# Patient Record
Sex: FEMALE | Race: WHITE | Employment: FULL TIME | URBAN - METROPOLITAN AREA
[De-identification: names, ages, dates, MRNs, and addresses within clinical notes are randomized per-mention and may not be internally consistent; named-entity substitution may affect disease eponyms.]

---

## 2021-07-31 ENCOUNTER — APPOINTMENT (OUTPATIENT)
Dept: GENERAL RADIOLOGY | Age: 51
End: 2021-07-31
Attending: EMERGENCY MEDICINE
Payer: OTHER GOVERNMENT

## 2021-07-31 ENCOUNTER — HOSPITAL ENCOUNTER (EMERGENCY)
Age: 51
Discharge: HOME | End: 2021-07-31
Attending: EMERGENCY MEDICINE
Payer: OTHER GOVERNMENT

## 2021-07-31 VITALS
SYSTOLIC BLOOD PRESSURE: 114 MMHG | TEMPERATURE: 98.5 F | WEIGHT: 150 LBS | DIASTOLIC BLOOD PRESSURE: 75 MMHG | BODY MASS INDEX: 27.6 KG/M2 | HEIGHT: 62 IN | OXYGEN SATURATION: 99 % | RESPIRATION RATE: 18 BRPM | HEART RATE: 79 BPM

## 2021-07-31 DIAGNOSIS — S90.121A CONTUSION OF LESSER TOE OF RIGHT FOOT WITHOUT DAMAGE TO NAIL, INITIAL ENCOUNTER: Primary | ICD-10-CM

## 2021-07-31 PROCEDURE — 99283 EMERGENCY DEPT VISIT LOW MDM: CPT

## 2021-07-31 PROCEDURE — 73660 X-RAY EXAM OF TOE(S): CPT

## 2021-07-31 RX ORDER — BUTALBITAL, ACETAMINOPHEN, CAFFEINE AND CODEINE PHOSPHATE 50; 325; 40; 30 MG/1; MG/1; MG/1; MG/1
1 CAPSULE ORAL
COMMUNITY

## 2021-07-31 NOTE — ED TRIAGE NOTES
C/o right 2nd toe pain/injury; states she stubbed it on a curb yesterday. States she has iced the toe and taken Motrin for pain with minimal relief from pain.

## 2021-07-31 NOTE — ED PROVIDER NOTES
EMERGENCY DEPARTMENT HISTORY AND PHYSICAL EXAM    5:53 PM    Date: 2021  Patient Name: Tarah Boykin    History of Presenting Illness     Chief Complaint   Patient presents with    Toe Pain       History Provided By: Patient  Location/Duration/Severity/Modifying factors   Patient is a 59-year-old female presenting with right second toe pain. Patient reports that yesterday she stubbed her toe on a piece of sidewalk in concrete. She is concerned it may be broken. She denies any open wound or any nail injury. Nothing seems to make her pain better or worse. It is most tender at the distal phalanx. No other injuries. PCP: Cameron Dee, DO    Current Outpatient Medications   Medication Sig Dispense Refill    levothyroxine sodium (SYNTHROID PO) Take  by mouth daily.  codeine-butalbital-acetaminophen-caffeine (FIORICET WITH CODEINE) -54-30 mg capsule Take 1 Capsule by mouth every four (4) hours as needed for Headache. Past History     Past Medical History:  Past Medical History:   Diagnosis Date    Endocrine disease        Past Surgical History:  Past Surgical History:   Procedure Laterality Date    HX BREAST AUGMENTATION      HX  SECTION      HX TONSILLECTOMY         Family History:  History reviewed. No pertinent family history. Social History:  Social History     Tobacco Use    Smoking status: Current Every Day Smoker     Packs/day: 0.50     Years: 30.00     Pack years: 15.00   Substance Use Topics    Alcohol use: Yes     Comment: social    Drug use: Not Currently       Allergies:  No Known Allergies    I reviewed and confirmed the above information with patient and updated as necessary. Review of Systems     Review of Systems   Constitutional: Negative for fever. HENT: Negative for congestion. Respiratory: Negative for shortness of breath. Cardiovascular: Negative for chest pain. Genitourinary: Negative for difficulty urinating. Musculoskeletal: Positive for arthralgias (2nd toe pain). Negative for back pain and neck pain. Physical Exam     Visit Vitals  /75 (BP 1 Location: Left upper arm, BP Patient Position: At rest)   Pulse 79   Temp 98.5 °F (36.9 °C)   Resp 18   Ht 5' 2\" (1.575 m)   Wt 68 kg (150 lb)   SpO2 99%   BMI 27.44 kg/m²       Physical Exam  HENT:      Head: Normocephalic and atraumatic. Mouth/Throat:      Mouth: Mucous membranes are moist.   Eyes:      Conjunctiva/sclera: Conjunctivae normal.   Cardiovascular:      Rate and Rhythm: Normal rate and regular rhythm. Pulmonary:      Effort: Pulmonary effort is normal.      Breath sounds: Normal breath sounds. No wheezing. Musculoskeletal:      Cervical back: Normal range of motion and neck supple. Right lower leg: No edema. Left lower leg: No edema. Comments: There is tenderness to the distal phalanx of the right second toe. There is no nail damage, there is no other areas of pain or tenderness   Skin:     General: Skin is warm and dry. Capillary Refill: Capillary refill takes less than 2 seconds. Findings: No rash. Diagnostic Study Results     Labs -  No results found for this or any previous visit (from the past 24 hour(s)). Radiologic Studies -   XR 2ND TOE RT MIN 2 V    (Results Pending)   My interpretation of the toe x-ray, I question if there may be a fracture on the Lateral view, there is no displaced fracture or deformity        Medical Decision Making   I am the first provider for this patient. I reviewed the vital signs, available nursing notes, past medical history, past surgical history, family history and social history. Vital Signs-Reviewed the patient's vital signs.     EKG: N/A    Records Reviewed: Nursing Notes, Old Medical Records, Previous Radiology Studies and Previous Laboratory Studies (Time of Review: 5:53 PM)    ED Course: Progress Notes, Reevaluation, and Consults:         Provider Notes (Medical Decision Making):   MDM  Number of Diagnoses or Management Options  Contusion of lesser toe of right foot without damage to nail, initial encounter  Diagnosis management comments: 51-year-old female presenting with toe injury. Patient reports the injury happened yesterday. She has never had this happen to her before. Differential includes fracture, contusion, subungual hematoma, dislocation deformity, etc.    On x-ray there may be a questionable area of hairline fracture to the second toe, regardless treatment will be the same we will plan for buddy taping, will have her follow-up with PCP and/or podiatry should she need it. At this time, patient is stable and appropriate for discharge home.  Patient demonstrates understanding of current diagnoses and is in agreement with the treatment plan. Beryle Fishman are advised that while the likelihood of serious underlying condition is low at this point given the evaluation performed today, we cannot fully rule it out. Beryle Fishman are advised to immediately return with any new symptoms or worsening of current condition.  All questions have been answered. Dm Yang is given educational material regarding their diagnoses, including danger symptoms and when to return to the ED. This note was dictated utilizing Dragon voice recognition software. Unfortunately this leads to occasional typographical errors. I apologize in advance if the situation occurs. If questions occur please do not hesitate to contact me directly. Natalia Cabrera, DO          Procedures    Critical Care Time: N/A    Diagnosis     Clinical Impression:   1.  Contusion of lesser toe of right foot without damage to nail, initial encounter        Disposition: Discharge    Follow-up Information     Follow up With Specialties Details Why Contact Info    Bradley Kocher, [address] [id number] [address]      Yonas Gimenez DPM Podiatry In 1 week As needed [address] [place] [address]  [address] [facility] EMERGENCY DEPT Emergency Medicine  As needed, If symptoms worsen; or [facility] Emergency Department [address] [facility]  [phone]           Patient's Medications   Start Taking    No medications on file   Continue Taking    CODEINE-BUTALBITAL-ACETAMINOPHEN-CAFFEINE (FIORICET WITH CODEINE) -08-30 MG CAPSULE    Take 1 Capsule by mouth every four (4) hours as needed for Headache. LEVOTHYROXINE SODIUM (SYNTHROID PO)    Take  by mouth daily. These Medications have changed    No medications on file   Stop Taking    No medications on file       Damaso Garay DO   Emergency Medicine   July 31, 2021, 5:53 PM     This note is dictated utilizing Dragon voice recognition software. Unfortunately this leads to occasional typographical errors using the voice recognition. I apologize in advance if the situation occurs. If questions occur please do not hesitate to contact me directly.     Damaso Garay, DO

## 2022-11-20 ENCOUNTER — APPOINTMENT (OUTPATIENT)
Dept: CT IMAGING | Age: 52
End: 2022-11-20
Attending: EMERGENCY MEDICINE
Payer: OTHER GOVERNMENT

## 2022-11-20 ENCOUNTER — HOSPITAL ENCOUNTER (EMERGENCY)
Age: 52
Discharge: HOME | End: 2022-11-20
Attending: EMERGENCY MEDICINE
Payer: OTHER GOVERNMENT

## 2022-11-20 VITALS
DIASTOLIC BLOOD PRESSURE: 75 MMHG | HEIGHT: 62 IN | HEART RATE: 89 BPM | BODY MASS INDEX: 23 KG/M2 | TEMPERATURE: 97.4 F | RESPIRATION RATE: 18 BRPM | SYSTOLIC BLOOD PRESSURE: 133 MMHG | OXYGEN SATURATION: 98 % | WEIGHT: 125 LBS

## 2022-11-20 DIAGNOSIS — R10.13 ABDOMINAL PAIN, EPIGASTRIC: Primary | ICD-10-CM

## 2022-11-20 LAB
ALBUMIN SERPL-MCNC: 4.1 G/DL (ref 3.4–5)
ALBUMIN/GLOB SERPL: 1.4 {RATIO} (ref 0.8–1.7)
ALP SERPL-CCNC: 116 U/L (ref 45–117)
ALT SERPL-CCNC: 96 U/L (ref 13–56)
ANION GAP SERPL CALC-SCNC: 4 MMOL/L (ref 3–18)
APPEARANCE UR: CLEAR
AST SERPL-CCNC: 161 U/L (ref 10–38)
ATRIAL RATE: 86 BPM
BASOPHILS # BLD: 0 K/UL (ref 0–0.1)
BASOPHILS NFR BLD: 0 % (ref 0–2)
BILIRUB SERPL-MCNC: 0.5 MG/DL (ref 0.2–1)
BILIRUB UR QL: NEGATIVE
BUN SERPL-MCNC: 7 MG/DL (ref 7–18)
BUN/CREAT SERPL: 9 (ref 12–20)
CALCIUM SERPL-MCNC: 9.7 MG/DL (ref 8.5–10.1)
CALCULATED P AXIS, ECG09: 77 DEGREES
CALCULATED R AXIS, ECG10: 64 DEGREES
CALCULATED T AXIS, ECG11: 70 DEGREES
CHLORIDE SERPL-SCNC: 106 MMOL/L (ref 100–111)
CO2 SERPL-SCNC: 32 MMOL/L (ref 21–32)
COLOR UR: YELLOW
CREAT SERPL-MCNC: 0.75 MG/DL (ref 0.6–1.3)
DIAGNOSIS, 93000: NORMAL
DIFFERENTIAL METHOD BLD: NORMAL
EOSINOPHIL # BLD: 0.1 K/UL (ref 0–0.4)
EOSINOPHIL NFR BLD: 2 % (ref 0–5)
ERYTHROCYTE [DISTWIDTH] IN BLOOD BY AUTOMATED COUNT: 11.9 % (ref 11.6–14.5)
GLOBULIN SER CALC-MCNC: 3 G/DL (ref 2–4)
GLUCOSE SERPL-MCNC: 106 MG/DL (ref 74–99)
GLUCOSE UR STRIP.AUTO-MCNC: NEGATIVE MG/DL
HCT VFR BLD AUTO: 40.8 % (ref 35–45)
HGB BLD-MCNC: 14 G/DL (ref 12–16)
HGB UR QL STRIP: NEGATIVE
IMM GRANULOCYTES # BLD AUTO: 0 K/UL (ref 0–0.04)
IMM GRANULOCYTES NFR BLD AUTO: 0 % (ref 0–0.5)
KETONES UR QL STRIP.AUTO: NEGATIVE MG/DL
LEUKOCYTE ESTERASE UR QL STRIP.AUTO: NEGATIVE
LIPASE SERPL-CCNC: 116 U/L (ref 73–393)
LYMPHOCYTES # BLD: 2.3 K/UL (ref 0.9–3.6)
LYMPHOCYTES NFR BLD: 28 % (ref 21–52)
MCH RBC QN AUTO: 33.3 PG (ref 24–34)
MCHC RBC AUTO-ENTMCNC: 34.3 G/DL (ref 31–37)
MCV RBC AUTO: 96.9 FL (ref 78–100)
MONOCYTES # BLD: 0.5 K/UL (ref 0.05–1.2)
MONOCYTES NFR BLD: 6 % (ref 3–10)
NEUTS SEG # BLD: 5.4 K/UL (ref 1.8–8)
NEUTS SEG NFR BLD: 65 % (ref 40–73)
NITRITE UR QL STRIP.AUTO: NEGATIVE
NRBC # BLD: 0 K/UL (ref 0–0.01)
NRBC BLD-RTO: 0 PER 100 WBC
P-R INTERVAL, ECG05: 146 MS
PH UR STRIP: 7.5 [PH] (ref 5–8)
PLATELET # BLD AUTO: 238 K/UL (ref 135–420)
PMV BLD AUTO: 9.6 FL (ref 9.2–11.8)
POTASSIUM SERPL-SCNC: 4.1 MMOL/L (ref 3.5–5.5)
PROT SERPL-MCNC: 7.1 G/DL (ref 6.4–8.2)
PROT UR STRIP-MCNC: NEGATIVE MG/DL
Q-T INTERVAL, ECG07: 358 MS
QRS DURATION, ECG06: 74 MS
QTC CALCULATION (BEZET), ECG08: 428 MS
RBC # BLD AUTO: 4.21 M/UL (ref 4.2–5.3)
SODIUM SERPL-SCNC: 142 MMOL/L (ref 136–145)
SP GR UR REFRACTOMETRY: <1.005 (ref 1–1.03)
UROBILINOGEN UR QL STRIP.AUTO: 0.2 EU/DL (ref 0.2–1)
VENTRICULAR RATE, ECG03: 86 BPM
WBC # BLD AUTO: 8.4 K/UL (ref 4.6–13.2)

## 2022-11-20 PROCEDURE — 81003 URINALYSIS AUTO W/O SCOPE: CPT

## 2022-11-20 PROCEDURE — 99285 EMERGENCY DEPT VISIT HI MDM: CPT

## 2022-11-20 PROCEDURE — 93005 ELECTROCARDIOGRAM TRACING: CPT

## 2022-11-20 PROCEDURE — 74011000636 HC RX REV CODE- 636: Performed by: EMERGENCY MEDICINE

## 2022-11-20 PROCEDURE — 83690 ASSAY OF LIPASE: CPT

## 2022-11-20 PROCEDURE — 74177 CT ABD & PELVIS W/CONTRAST: CPT

## 2022-11-20 PROCEDURE — 96372 THER/PROPH/DIAG INJ SC/IM: CPT

## 2022-11-20 PROCEDURE — 85025 COMPLETE CBC W/AUTO DIFF WBC: CPT

## 2022-11-20 PROCEDURE — 80053 COMPREHEN METABOLIC PANEL: CPT

## 2022-11-20 PROCEDURE — 74011250636 HC RX REV CODE- 250/636: Performed by: EMERGENCY MEDICINE

## 2022-11-20 RX ORDER — BUPROPION HYDROCHLORIDE 75 MG/1
75 TABLET ORAL 2 TIMES DAILY
COMMUNITY

## 2022-11-20 RX ORDER — FAMOTIDINE 20 MG/1
20 TABLET, FILM COATED ORAL 2 TIMES DAILY
Qty: 10 TABLET | Refills: 0 | Status: SHIPPED | OUTPATIENT
Start: 2022-11-20 | End: 2022-11-25

## 2022-11-20 RX ORDER — DICYCLOMINE HYDROCHLORIDE 10 MG/ML
20 INJECTION INTRAMUSCULAR
Status: COMPLETED | OUTPATIENT
Start: 2022-11-20 | End: 2022-11-20

## 2022-11-20 RX ORDER — DICYCLOMINE HYDROCHLORIDE 20 MG/1
20 TABLET ORAL EVERY 6 HOURS
Qty: 15 TABLET | Refills: 0 | Status: SHIPPED | OUTPATIENT
Start: 2022-11-20

## 2022-11-20 RX ORDER — ONDANSETRON 4 MG/1
4 TABLET, FILM COATED ORAL
Qty: 12 TABLET | Refills: 0 | Status: SHIPPED | OUTPATIENT
Start: 2022-11-20

## 2022-11-20 RX ADMIN — DICYCLOMINE HYDROCHLORIDE 20 MG: 10 INJECTION, SOLUTION INTRAMUSCULAR at 04:13

## 2022-11-20 RX ADMIN — IOPAMIDOL 100 ML: 612 INJECTION, SOLUTION INTRAVENOUS at 02:32

## 2022-11-20 NOTE — ED PROVIDER NOTES
Epigastric Pain      Patient is a 49 yo female who presents to the ER  c/o epigastric pain that radiates to her back that has been intermittent The epigastic pain began 2 hours ago and been persistnet . Some relief from antiacids initially. Past Medical History:   Diagnosis Date    Endocrine disease        Past Surgical History:   Procedure Laterality Date    HX BREAST AUGMENTATION      HX  SECTION      HX TONSILLECTOMY           History reviewed. No pertinent family history. Social History     Socioeconomic History    Marital status:      Spouse name: Not on file    Number of children: Not on file    Years of education: Not on file    Highest education level: Not on file   Occupational History    Not on file   Tobacco Use    Smoking status: Every Day     Packs/day: 0.50     Years: 30.00     Pack years: 15.00     Types: Cigarettes    Smokeless tobacco: Not on file   Substance and Sexual Activity    Alcohol use: Yes     Comment: social    Drug use: Not Currently    Sexual activity: Not on file   Other Topics Concern    Not on file   Social History Narrative    Not on file     Social Determinants of Health     Financial Resource Strain: Not on file   Food Insecurity: Not on file   Transportation Needs: Not on file   Physical Activity: Not on file   Stress: Not on file   Social Connections: Not on file   Intimate Partner Violence: Not on file   Housing Stability: Not on file         ALLERGIES: Patient has no known allergies. Review of Systems   Constitutional: Negative. HENT: Negative. Eyes: Negative. Respiratory: Negative. Cardiovascular: Negative. Gastrointestinal:  Positive for abdominal pain. Endocrine: Negative. Genitourinary: Negative. Musculoskeletal: Negative. Skin: Negative. Allergic/Immunologic: Negative. Neurological: Negative. Hematological: Negative. Psychiatric/Behavioral: Negative.      All other systems reviewed and are negative. Vitals:    11/20/22 0022   BP: 135/78   Pulse: 93   Resp: 18   Temp: 97.9 °F (36.6 °C)   SpO2: 97%   Weight: 56.7 kg (125 lb)   Height: 5' 2\" (1.575 m)            Physical Exam  Vitals and nursing note reviewed. Constitutional:       General: She is not in acute distress. Appearance: She is well-developed. She is not diaphoretic. HENT:      Head: Normocephalic. Right Ear: External ear normal.      Left Ear: External ear normal.      Mouth/Throat:      Pharynx: No oropharyngeal exudate. Eyes:      General: No scleral icterus. Right eye: No discharge. Left eye: No discharge. Conjunctiva/sclera: Conjunctivae normal.      Pupils: Pupils are equal, round, and reactive to light. Neck:      Thyroid: No thyromegaly. Vascular: No JVD. Trachea: No tracheal deviation. Cardiovascular:      Rate and Rhythm: Normal rate and regular rhythm. Heart sounds: Normal heart sounds. No murmur heard. No friction rub. No gallop. Pulmonary:      Effort: Pulmonary effort is normal. No respiratory distress. Breath sounds: Normal breath sounds. No stridor. No wheezing or rales. Chest:      Chest wall: No tenderness. Abdominal:      General: Bowel sounds are normal. There is no distension. Palpations: Abdomen is soft. There is no mass. Tenderness: There is no abdominal tenderness. There is no guarding or rebound. Musculoskeletal:         General: No tenderness. Normal range of motion. Cervical back: Normal range of motion and neck supple. Lymphadenopathy:      Cervical: No cervical adenopathy. Skin:     General: Skin is warm and dry. Coloration: Skin is not pale. Findings: No erythema or rash. Neurological:      Mental Status: She is alert and oriented to person, place, and time. Cranial Nerves: No cranial nerve deficit. Motor: No abnormal muscle tone.       Coordination: Coordination normal.      Deep Tendon Reflexes: Reflexes normal.        MDM    Differential diagnosis: Reflux esophagitis, peptic ulcer disease, cholecystitis, pancreatitis, intussusception    Procedures    Hospital course[de-identified] patient remained stable. Lab tests:  interpreted by me    CT scan abdomen: Interpreted by me    EKG: interpreted by me    Patient remained stable throughout ER evaluation. Patient symptoms responded to ER GI treatment. Diagnosis: GERD    Disposition: D/C home. F/U PCP in 24 hours. Return to ER prn. Dictation disclaimer:  Please note that this dictation was completed with [employer], the computer voice recognition software. Quite often unanticipated grammatical, syntax, homophones, and other interpretive errors are inadvertently transcribed by the computer software. Please disregard these errors. Please excuse any errors that have escaped final proofreading.

## 2022-11-20 NOTE — ED NOTES
I have reviewed discharge instructions with the patient. The patient verbalized understanding. Patient armband removed and given to patient to take home. Patient was informed of the privacy risks if armband lost or stolen  Current Discharge Medication List        START taking these medications    Details   ondansetron hcl (Zofran) 4 mg tablet Take 1 Tablet by mouth every eight (8) hours as needed for Nausea. Qty: 12 Tablet, Refills: 0  Start date: 11/20/2022      dicyclomine (BENTYL) 20 mg tablet Take 1 Tablet by mouth every six (6) hours. Indications: irritable colon  Qty: 15 Tablet, Refills: 0  Start date: 11/20/2022      famotidine (Pepcid) 20 mg tablet Take 1 Tablet by mouth two (2) times a day for 5 days.  Indications: indigestion  Qty: 10 Tablet, Refills: 0  Start date: 11/20/2022, End date: 11/25/2022

## 2022-11-20 NOTE — Clinical Note
[address] [facility] EMERGENCY DEPT  [address] [address] [phone]    Work/School Note    Date: 11/20/2022    To Whom It May concern:    Melva Lopez was seen and treated today in the emergency room by the following provider(s):  Attending Provider: Aurelia Hodges MD.      Melva Lopez is excused from work/school on 11/20/2022 through 11/22/2022. She is medically clear to return to work/school on 11/23/2022.          Sincerely,          Chelsy Victoria MD

## 2022-11-20 NOTE — ED TRIAGE NOTES
A&O female with c/o epigastric pain that radiates to her back. Pain began on 11/16. Has been intermittent approx every 3 hrs. Some relief from antiacids initially.

## 2023-01-24 ENCOUNTER — TRANSCRIBE ORDER (OUTPATIENT)
Dept: SCHEDULING | Age: 53
End: 2023-01-24

## 2023-01-24 DIAGNOSIS — R10.13 EPIGASTRIC PAIN: Primary | ICD-10-CM

## 2023-01-30 ENCOUNTER — TRANSCRIBE ORDERS (OUTPATIENT)
Facility: HOSPITAL | Age: 53
End: 2023-01-30

## 2023-01-30 DIAGNOSIS — R10.13 EPIGASTRIC PAIN: Primary | ICD-10-CM

## 2023-02-15 ENCOUNTER — HOSPITAL ENCOUNTER (OUTPATIENT)
Facility: HOSPITAL | Age: 53
Discharge: HOME | End: 2023-02-18
Payer: OTHER GOVERNMENT

## 2023-02-15 DIAGNOSIS — R10.13 EPIGASTRIC PAIN: ICD-10-CM

## 2023-02-15 PROCEDURE — A9577 INJ MULTIHANCE: HCPCS | Performed by: FAMILY MEDICINE

## 2023-02-15 PROCEDURE — 6360000004 HC RX CONTRAST MEDICATION: Performed by: FAMILY MEDICINE

## 2023-02-15 PROCEDURE — 74183 MRI ABD W/O CNTR FLWD CNTR: CPT

## 2023-02-15 RX ADMIN — GADOBENATE DIMEGLUMINE 20 ML: 529 INJECTION, SOLUTION INTRAVENOUS at 11:41

## 2023-06-05 ENCOUNTER — APPOINTMENT (OUTPATIENT)
Facility: HOSPITAL | Age: 53
End: 2023-06-05
Payer: OTHER GOVERNMENT

## 2023-06-05 ENCOUNTER — HOSPITAL ENCOUNTER (EMERGENCY)
Facility: HOSPITAL | Age: 53
Discharge: HOME | End: 2023-06-05
Attending: EMERGENCY MEDICINE
Payer: OTHER GOVERNMENT

## 2023-06-05 VITALS
WEIGHT: 148.2 LBS | DIASTOLIC BLOOD PRESSURE: 84 MMHG | HEART RATE: 99 BPM | BODY MASS INDEX: 27.27 KG/M2 | SYSTOLIC BLOOD PRESSURE: 119 MMHG | OXYGEN SATURATION: 100 % | TEMPERATURE: 99.3 F | HEIGHT: 62 IN | RESPIRATION RATE: 16 BRPM

## 2023-06-05 DIAGNOSIS — R21 RASH AND OTHER NONSPECIFIC SKIN ERUPTION: Primary | ICD-10-CM

## 2023-06-05 DIAGNOSIS — B02.9 HERPES ZOSTER WITHOUT COMPLICATION: ICD-10-CM

## 2023-06-05 PROCEDURE — 73130 X-RAY EXAM OF HAND: CPT

## 2023-06-05 PROCEDURE — 99283 EMERGENCY DEPT VISIT LOW MDM: CPT

## 2023-06-05 RX ORDER — CEPHALEXIN 250 MG/1
250 CAPSULE ORAL 4 TIMES DAILY
Qty: 28 CAPSULE | Refills: 0 | Status: SHIPPED | OUTPATIENT
Start: 2023-06-05 | End: 2023-06-12

## 2023-06-05 RX ORDER — HYDROCODONE BITARTRATE AND ACETAMINOPHEN 5; 325 MG/1; MG/1
1 TABLET ORAL EVERY 4 HOURS PRN
Qty: 12 TABLET | Refills: 0 | Status: SHIPPED | OUTPATIENT
Start: 2023-06-05 | End: 2023-06-08

## 2023-06-05 RX ORDER — FAMCICLOVIR 500 MG/1
500 TABLET ORAL 3 TIMES DAILY
Qty: 21 TABLET | Refills: 0 | Status: SHIPPED | OUTPATIENT
Start: 2023-06-05 | End: 2023-06-12

## 2023-06-05 ASSESSMENT — PAIN DESCRIPTION - DESCRIPTORS: DESCRIPTORS: BURNING;THROBBING

## 2023-06-05 ASSESSMENT — PAIN DESCRIPTION - ORIENTATION: ORIENTATION: LEFT

## 2023-06-05 ASSESSMENT — PAIN - FUNCTIONAL ASSESSMENT: PAIN_FUNCTIONAL_ASSESSMENT: 0-10

## 2023-06-05 ASSESSMENT — PAIN SCALES - GENERAL: PAINLEVEL_OUTOF10: 3

## 2023-06-05 ASSESSMENT — PAIN DESCRIPTION - LOCATION: LOCATION: HAND

## 2023-06-05 NOTE — ED TRIAGE NOTES
Pain in left thumb with blisters since yesterday. Pain started to go up left arm to axilla in past few hours. These symptoms are the same as September and 10 years prior.  Motrin this AM.

## 2023-06-06 NOTE — ED NOTES
Discharge instructions reviewed with patient. Questions and concerns addressed. Bracelet removed and shredded.        Hope Courtney, ERLIN  06/05/23 2943

## 2023-06-06 NOTE — ED PROVIDER NOTES
COURSE and DIFFERENTIAL DIAGNOSIS/MDM:   Vitals:    Vitals:    06/05/23 1856   BP: 119/84   Pulse: 99   Resp: 16   Temp: 99.3 °F (37.4 °C)   TempSrc: Oral   SpO2: 100%   Weight: 148 lb 3.2 oz (67.2 kg)   Height: 5' 2\" (1.575 m)         Discussion:       Concern for shingles  Pt req imaging, told thorn seen in past in hand  No emc. Nvi. Stable for dc and close f/up. Detailed ret inst given. Eloy Birch FINAL IMPRESSION      1. Rash and other nonspecific skin eruption    2. Herpes zoster without complication          DISPOSITION/PLAN   DISPOSITION Decision To Discharge 06/05/2023 09:37:46 PM      PATIENT REFERRED TO:  [facility] EMERGENCY DEPT  [address]  [phone]  Go to   As needed, If symptoms worsen    Eliana Perez DO  [facility]  [phone]            DISCHARGE MEDICATIONS:  Discharge Medication List as of 6/5/2023  9:42 PM        START taking these medications    Details   HYDROcodone-acetaminophen (NORCO) 5-325 MG per tablet Take 1 tablet by mouth every 4 hours as needed for Pain for up to 3 days. Intended supply: 3 days. Take lowest dose possible to manage pain Max Daily Amount: 6 tablets, Disp-12 tablet, R-0Normal      cephALEXin (KEFLEX) 250 MG capsule Take 1 capsule by mouth 4 times daily for 7 days, Disp-28 capsule, R-0Normal      famciclovir (FAMVIR) 500 MG tablet Take 1 tablet by mouth 3 times daily for 7 days, Disp-21 tablet, R-0Normal           Controlled Substances Monitoring:     No flowsheet data found.     (Please note that portions of this note were completed with a voice recognition program.  Efforts were made to edit the dictations but occasionally words are mis-transcribed.)    Tia Tse MD (electronically signed)  Attending Emergency Physician          Octavio Flynn MD  06/07/23 9775

## 2024-11-26 ENCOUNTER — HOSPITAL ENCOUNTER (EMERGENCY)
Facility: HOSPITAL | Age: 54
Discharge: HOME | End: 2024-11-26
Attending: EMERGENCY MEDICINE
Payer: OTHER GOVERNMENT

## 2024-11-26 VITALS
BODY MASS INDEX: 26.68 KG/M2 | OXYGEN SATURATION: 99 % | HEART RATE: 92 BPM | WEIGHT: 145 LBS | SYSTOLIC BLOOD PRESSURE: 134 MMHG | DIASTOLIC BLOOD PRESSURE: 81 MMHG | TEMPERATURE: 97.6 F | HEIGHT: 62 IN | RESPIRATION RATE: 18 BRPM

## 2024-11-26 DIAGNOSIS — K04.7 PERIAPICAL ABSCESS: Primary | ICD-10-CM

## 2024-11-26 DIAGNOSIS — K02.9 DENTAL CARIES: ICD-10-CM

## 2024-11-26 PROCEDURE — 6370000000 HC RX 637 (ALT 250 FOR IP): Performed by: EMERGENCY MEDICINE

## 2024-11-26 PROCEDURE — 6360000002 HC RX W HCPCS: Performed by: EMERGENCY MEDICINE

## 2024-11-26 PROCEDURE — 99283 EMERGENCY DEPT VISIT LOW MDM: CPT

## 2024-11-26 RX ORDER — BUTALBITAL, ACETAMINOPHEN AND CAFFEINE 50; 325; 40 MG/1; MG/1; MG/1
1 TABLET ORAL EVERY 4 HOURS PRN
COMMUNITY

## 2024-11-26 RX ORDER — VARENICLINE TARTRATE 1 MG/1
1 TABLET, FILM COATED ORAL 2 TIMES DAILY
COMMUNITY

## 2024-11-26 RX ORDER — ACETAMINOPHEN 325 MG/1
650 TABLET ORAL
Status: COMPLETED | OUTPATIENT
Start: 2024-11-26 | End: 2024-11-26

## 2024-11-26 RX ORDER — IBUPROFEN 600 MG/1
600 TABLET, FILM COATED ORAL EVERY 6 HOURS PRN
Qty: 20 TABLET | Refills: 0 | Status: SHIPPED | OUTPATIENT
Start: 2024-11-26

## 2024-11-26 RX ORDER — CLINDAMYCIN HYDROCHLORIDE 150 MG/1
450 CAPSULE ORAL
Status: COMPLETED | OUTPATIENT
Start: 2024-11-26 | End: 2024-11-26

## 2024-11-26 RX ORDER — DEXAMETHASONE SODIUM PHOSPHATE 4 MG/ML
10 INJECTION, SOLUTION INTRA-ARTICULAR; INTRALESIONAL; INTRAMUSCULAR; INTRAVENOUS; SOFT TISSUE
Status: COMPLETED | OUTPATIENT
Start: 2024-11-26 | End: 2024-11-26

## 2024-11-26 RX ORDER — CLINDAMYCIN HYDROCHLORIDE 150 MG/1
450 CAPSULE ORAL 4 TIMES DAILY
Qty: 84 CAPSULE | Refills: 0 | Status: SHIPPED | OUTPATIENT
Start: 2024-11-26 | End: 2024-12-03

## 2024-11-26 RX ORDER — SACCHAROMYCES BOULARDII 250 MG
250 CAPSULE ORAL 2 TIMES DAILY
Qty: 14 CAPSULE | Refills: 0 | Status: SHIPPED | OUTPATIENT
Start: 2024-11-26 | End: 2024-12-03

## 2024-11-26 RX ORDER — IBUPROFEN 800 MG/1
800 TABLET, FILM COATED ORAL EVERY 4 HOURS
COMMUNITY
End: 2024-11-26

## 2024-11-26 RX ORDER — ACETAMINOPHEN 500 MG
500 TABLET ORAL EVERY 6 HOURS PRN
Qty: 30 TABLET | Refills: 1 | Status: SHIPPED | OUTPATIENT
Start: 2024-11-26

## 2024-11-26 RX ORDER — HYDROCODONE BITARTRATE AND ACETAMINOPHEN 5; 325 MG/1; MG/1
1 TABLET ORAL EVERY 6 HOURS PRN
Qty: 4 TABLET | Refills: 0 | Status: SHIPPED | OUTPATIENT
Start: 2024-11-26 | End: 2024-11-29

## 2024-11-26 RX ORDER — LEVOTHYROXINE SODIUM 75 UG/1
75 TABLET ORAL DAILY
COMMUNITY

## 2024-11-26 RX ADMIN — DEXAMETHASONE SODIUM PHOSPHATE 10 MG: 4 INJECTION, SOLUTION INTRAMUSCULAR; INTRAVENOUS at 10:19

## 2024-11-26 RX ADMIN — ACETAMINOPHEN 325MG 650 MG: 325 TABLET ORAL at 10:20

## 2024-11-26 RX ADMIN — CLINDAMYCIN HYDROCHLORIDE 450 MG: 150 CAPSULE ORAL at 10:20

## 2024-11-26 ASSESSMENT — ENCOUNTER SYMPTOMS
FACIAL SWELLING: 1
EYES NEGATIVE: 1
ABDOMINAL PAIN: 0
CHEST TIGHTNESS: 0

## 2024-11-26 ASSESSMENT — PAIN - FUNCTIONAL ASSESSMENT: PAIN_FUNCTIONAL_ASSESSMENT: 0-10

## 2024-11-26 ASSESSMENT — PAIN DESCRIPTION - ORIENTATION: ORIENTATION: LEFT

## 2024-11-26 ASSESSMENT — PAIN DESCRIPTION - PAIN TYPE: TYPE: ACUTE PAIN

## 2024-11-26 ASSESSMENT — LIFESTYLE VARIABLES
HOW MANY STANDARD DRINKS CONTAINING ALCOHOL DO YOU HAVE ON A TYPICAL DAY: PATIENT DOES NOT DRINK
HOW OFTEN DO YOU HAVE A DRINK CONTAINING ALCOHOL: NEVER

## 2024-11-26 ASSESSMENT — PAIN DESCRIPTION - LOCATION: LOCATION: TEETH

## 2024-11-26 ASSESSMENT — PAIN SCALES - GENERAL: PAINLEVEL_OUTOF10: 5

## 2024-11-26 ASSESSMENT — PAIN DESCRIPTION - DESCRIPTORS: DESCRIPTORS: ACHING;TENDER;STABBING

## 2024-11-26 NOTE — ED TRIAGE NOTES
Ambulatory reports having dental pain that started Thursday, went to periodontist yesterday and was prescribe amoxicillin  and  was already taking motrin. Woke up today and face is double in size.

## 2024-11-26 NOTE — DISCHARGE INSTRUCTIONS
Start taking the clindamycin, stop the amoxicillin, and take ibuprofen and Tylenol during the day and if you have pain is not controlled with that you can take the hydrocodone.  You are on a large dose of antibiotics to help with your swelling and pain so make sure to take the probiotic as well.  Call your general dentist but also you will likely need to have an endodontist for root canal that may be something to be done by calling the offices directly.  I am giving you an endodontist and [facility] to see if they can give you an appointment.  Please return if you are at all worsened or concerned.

## 2024-11-26 NOTE — ED PROVIDER NOTES
Genitourinary:  Negative for dysuria.   Musculoskeletal:  Negative for arthralgias.   Skin: Negative.    Neurological:  Negative for weakness.   Hematological: Negative.          Physical Exam   /81   Pulse 92   Temp 97.6 °F (36.4 °C)   Resp 18   Ht 1.575 m (5' 2\")   Wt 65.8 kg (145 lb)   LMP  (LMP Unknown)   SpO2 99%   BMI 26.52 kg/m²       Physical Exam  Vitals and nursing note reviewed.   Constitutional:       General: She is not in acute distress.     Appearance: Normal appearance.   HENT:      Head: Normocephalic and atraumatic.      Right Ear: External ear normal.      Left Ear: External ear normal.      Nose: Nose normal.      Mouth/Throat:      Mouth: Mucous membranes are moist.      Comments: Left maxillary swelling, tooth 23 as decaying at the base of the tooth without any fluctuance along the gumline with pain extending to the maxilla, floor the mouth is soft, buccal mucosa is normal, and no difficulty swallowing, no drooling, and is speaking clearly  Eyes:      Extraocular Movements: Extraocular movements intact.      Pupils: Pupils are equal, round, and reactive to light.   Cardiovascular:      Rate and Rhythm: Normal rate.   Pulmonary:      Effort: Pulmonary effort is normal.   Abdominal:      General: There is no distension.      Palpations: Abdomen is soft.   Musculoskeletal:         General: Normal range of motion.   Skin:     General: Skin is warm and dry.      Capillary Refill: Capillary refill takes less than 2 seconds.   Neurological:      General: No focal deficit present.      Mental Status: She is alert and oriented to person, place, and time.   Psychiatric:         Mood and Affect: Mood normal.         Behavior: Behavior normal.           Diagnostic Study Results     Labs -  No results found for this or any previous visit (from the past 12 hour(s)).    Radiologic Studies -   No orders to display         Medical Decision Making   I am the first provider for this patient.    I